# Patient Record
Sex: FEMALE | NOT HISPANIC OR LATINO | Employment: FULL TIME | URBAN - METROPOLITAN AREA
[De-identification: names, ages, dates, MRNs, and addresses within clinical notes are randomized per-mention and may not be internally consistent; named-entity substitution may affect disease eponyms.]

---

## 2020-05-13 RX ORDER — FOLIC ACID 1 MG/1
1 TABLET ORAL DAILY
COMMUNITY

## 2020-05-13 RX ORDER — BUPRENORPHINE HYDROCHLORIDE AND NALOXONE HYDROCHLORIDE DIHYDRATE 8; 2 MG/1; MG/1
1 TABLET SUBLINGUAL DAILY
COMMUNITY

## 2020-05-13 RX ORDER — SERTRALINE HYDROCHLORIDE 25 MG/1
25 TABLET, FILM COATED ORAL DAILY
COMMUNITY

## 2020-05-14 ENCOUNTER — OFFICE VISIT (OUTPATIENT)
Dept: NEUROSURGERY | Facility: CLINIC | Age: 45
End: 2020-05-14

## 2020-05-14 VITALS — BODY MASS INDEX: 35.19 KG/M2 | HEIGHT: 65 IN | WEIGHT: 211.2 LBS | TEMPERATURE: 98.2 F | RESPIRATION RATE: 15 BRPM

## 2020-05-14 DIAGNOSIS — M54.50 CHRONIC MIDLINE LOW BACK PAIN WITHOUT SCIATICA: Primary | ICD-10-CM

## 2020-05-14 DIAGNOSIS — G89.29 CHRONIC MIDLINE LOW BACK PAIN WITHOUT SCIATICA: Primary | ICD-10-CM

## 2020-05-14 DIAGNOSIS — M51.36 DDD (DEGENERATIVE DISC DISEASE), LUMBAR: ICD-10-CM

## 2020-05-14 PROBLEM — F33.1 MODERATE RECURRENT MAJOR DEPRESSION (HCC): Status: ACTIVE | Noted: 2020-05-14

## 2020-05-14 PROBLEM — J30.1 ALLERGIC RHINITIS DUE TO POLLEN: Status: ACTIVE | Noted: 2020-05-14

## 2020-05-14 PROBLEM — I10 ESSENTIAL HYPERTENSION: Status: ACTIVE | Noted: 2020-05-14

## 2020-05-14 PROBLEM — K21.9 GASTRO-ESOPHAGEAL REFLUX DISEASE WITHOUT ESOPHAGITIS: Status: ACTIVE | Noted: 2020-05-14

## 2020-05-14 PROBLEM — I11.9 HYPERTENSIVE HEART DISEASE WITHOUT CONGESTIVE HEART FAILURE: Status: ACTIVE | Noted: 2020-05-14

## 2020-05-14 PROCEDURE — 99203 OFFICE O/P NEW LOW 30 MIN: CPT | Performed by: NEUROLOGICAL SURGERY

## 2020-05-14 RX ORDER — FAMOTIDINE 20 MG/1
20 TABLET, FILM COATED ORAL 2 TIMES DAILY
COMMUNITY

## 2020-05-14 NOTE — PROGRESS NOTES
NAME: JUVE HOUSTON   DOS: 2020  : 1975  PCP: Marbella Hernandez PA    Chief Complaint:    Chief Complaint   Patient presents with   • Back Pain       History of Present Illness:  44 y.o. female   I saw this 44-year-old female with a history of chronic axial back pain for a number of years she has a history of fibromyalgia and seizure disorder.  She reports a fall approximately a year ago with worsening pain she has no symptoms of convincing sciatica but does occasionally have pain in her hips she occasionally has radiation but nothing that is persistent if she does walk distance she occasionally gets numbness in the lateral aspects of her feet.  She has not been through recent physical therapy but does attempt to tried yoga etc. she has a history of opioid use in the past but is weaned herself she is here for evaluation    PMHX  Allergies:  Allergies   Allergen Reactions   • Ultram [Tramadol] Other (See Comments)     Seizures   • Wellbutrin [Bupropion] Other (See Comments)     Seizures     Medications    Current Outpatient Medications:   •  famotidine (PEPCID) 20 MG tablet, Take 20 mg by mouth 2 (Two) Times a Day., Disp: , Rfl:   •  buprenorphine-naloxone (SUBOXONE) 8-2 MG per SL tablet, Place 1 tablet under the tongue Daily., Disp: , Rfl:   •  fluticasone (FLONASE) 50 MCG/ACT nasal spray, 2 sprays into each nostril daily. Administer 2 sprays in each nostril for each dose., Disp: , Rfl:   •  folic acid (FOLVITE) 1 MG tablet, Take 1 mg by mouth Daily., Disp: , Rfl:   •  naproxen (NAPROSYN) 500 MG tablet, Take 500 mg by mouth 2 (two) times a day with meals., Disp: , Rfl:   •  pregabalin (LYRICA) 300 MG capsule, Take 300 mg by mouth 2 (Two) Times a Day., Disp: , Rfl:   •  pseudoephedrine (SUDAFED) 30 MG tablet, Take 30 mg by mouth every 4 (four) hours as needed for congestion., Disp: , Rfl:   •  ranitidine (ZANTAC) 15 MG/ML syrup, Take 4 mg/kg/day by mouth 2 (two) times a day., Disp: , Rfl:   •   sertraline (ZOLOFT) 25 MG tablet, Take 25 mg by mouth Daily., Disp: , Rfl:   Past Medical History:  Past Medical History:   Diagnosis Date   • Arthritis    • Epilepsy (CMS/HCC)     Last seizure October 2011   • Fibromyalgia    • GERD (gastroesophageal reflux disease)    • History of transfusion    • Hypertension    • Low back pain    • Lumbosacral disc disease    • Seizures (CMS/HCC)      Past Surgical History:  Past Surgical History:   Procedure Laterality Date   • CARPAL TUNNEL RELEASE Right 2009   • CHOLECYSTECTOMY  1997   • HEMORRHOIDECTOMY  2016   • KNEE SURGERY Left 2013    arthoscopic   • SUBTOTAL HYSTERECTOMY     • TONSILECTOMY, ADENOIDECTOMY, BILATERAL MYRINGOTOMY AND TUBES  2001   • TUBAL ABDOMINAL LIGATION       Social Hx:  Social History     Tobacco Use   • Smoking status: Current Every Day Smoker   • Smokeless tobacco: Never Used   Substance Use Topics   • Alcohol use: Yes   • Drug use: Not on file     Family Hx:  Family History   Problem Relation Age of Onset   • Hyperlipidemia Mother    • Hypertension Mother    • Stroke Mother      Review of Systems:        Review of Systems   Constitutional: Negative for activity change, appetite change, chills, diaphoresis, fatigue, fever and unexpected weight change.   HENT: Negative for congestion, dental problem, drooling, ear discharge, ear pain, facial swelling, hearing loss, mouth sores, nosebleeds, postnasal drip, rhinorrhea, sinus pressure, sneezing, sore throat, tinnitus, trouble swallowing and voice change.    Eyes: Negative for photophobia, pain, discharge, redness, itching and visual disturbance.   Respiratory: Negative for apnea, cough, choking, chest tightness, shortness of breath, wheezing and stridor.    Cardiovascular: Negative for chest pain, palpitations and leg swelling.   Gastrointestinal: Negative for abdominal distention, abdominal pain, anal bleeding, blood in stool, constipation, diarrhea, nausea, rectal pain and vomiting.   Endocrine:  Negative for cold intolerance, heat intolerance, polydipsia, polyphagia and polyuria.   Genitourinary: Negative for decreased urine volume, difficulty urinating, dysuria, enuresis, flank pain, frequency, genital sores, hematuria and urgency.   Musculoskeletal: Positive for arthralgias, back pain, gait problem and myalgias. Negative for joint swelling, neck pain and neck stiffness.   Skin: Negative for color change, pallor, rash and wound.   Allergic/Immunologic: Negative for environmental allergies, food allergies and immunocompromised state.   Neurological: Positive for numbness. Negative for dizziness, tremors, seizures, syncope, facial asymmetry, speech difficulty, weakness, light-headedness and headaches.   Hematological: Negative for adenopathy. Does not bruise/bleed easily.   Psychiatric/Behavioral: Negative for agitation, behavioral problems, confusion, decreased concentration, dysphoric mood, hallucinations, self-injury, sleep disturbance and suicidal ideas. The patient is not nervous/anxious and is not hyperactive.    All other systems reviewed and are negative.     I have reviewed this note template and all pertinent parts of the review of systems social, family history, surgical history and medication list    Physical Examination:  Vitals:    05/14/20 1058   Resp: 15   Temp: 98.2 °F (36.8 °C)      General Appearance:   Well developed, well nourished, well groomed, alert, and cooperative.  Neurological examination:  Neurologic Exam  Vital signs were reviewed and documented in the chart  Patient appeared in good neurologic function with normal comprehension fluent speech  Mood and affect are normal  Sense of smell deferred    Pupils symmetric equally reactive funduscopic exam not visualized   Visual fields intact to confrontation  Extraocular movements intact  Face motor function is symmetric  Facial sensations normal  Hearing intact to finger rub hearing intact to finger rub  Tongue is midline  Palate  symmetric  Swallowing normal  Shoulder shrug normal  Some trigger points noted muscle bulk and tone normal  5 out of 5 strength no motor drift  Gait normal intact  Negative Romberg  No clonus long tract signs or myelopathy    Reflexes symmetric  Mild edema noted and extremities skin appears normal    Straight leg raise sign absent  No signs of intrinsic hip dysfunction however she is tight with decreased range of motion bilaterally  Back is without any lesions or abnormality  Feet are warm and well perfused        Review of Imaging/DATA:  I reviewed an MRI of her lumbar spine she is got degenerative disc with some annular tears  L2-3, there may be a small disc bulge intraforaminal at 3 4  Diagnoses/Plan:    Ms. Pisano is a 44 y.o. female   1.  Fibromyalgia history of many rheumatologic type symptoms including morning pain with alleviation with mobility and nonsteroidal anti-inflammatories she has no commencing neurogenic claudication relief of pain with flexion that would argue against degenerative disc I denies any persistent radiculopathy that being said she does have mid lumbar degenerative changes in approximately the 2 3-3 4 area I discussed surgery and I do not think that is the right answer for her    I recommended    Dietary modification  Follow-up with rheumatology for further work-up regarding rheumatologic disorders giving morning pain stiffness etc. and fibromyalgia management  Needs to form a relationship with an interventional pain specialist to work with pain treatment strategies.  Mindfulness and exercise yoga heat massage etc.    Spent 30 minutes with her discussing the treatment plan and explained signs and symptoms that would necessitate referral follow-up

## 2020-08-04 ENCOUNTER — TELEPHONE (OUTPATIENT)
Dept: PAIN MEDICINE | Facility: CLINIC | Age: 45
End: 2020-08-04

## 2020-08-04 NOTE — TELEPHONE ENCOUNTER
Attempted to contact patient to schedule new patient referral appt with Pain Management. No answer; Left voicemail for patient to return call with office number given.

## 2020-09-09 DIAGNOSIS — Z00.8 PRE-SURGICAL PSYCHOLOGICAL ASSESSMENT, ENCOUNTER FOR: Primary | ICD-10-CM

## 2024-10-11 ENCOUNTER — HOSPITAL ENCOUNTER (EMERGENCY)
Facility: HOSPITAL | Age: 49
Discharge: HOME | End: 2024-10-11
Attending: STUDENT IN AN ORGANIZED HEALTH CARE EDUCATION/TRAINING PROGRAM

## 2024-10-11 VITALS
TEMPERATURE: 98.1 F | BODY MASS INDEX: 31.82 KG/M2 | DIASTOLIC BLOOD PRESSURE: 79 MMHG | HEIGHT: 65 IN | HEART RATE: 52 BPM | WEIGHT: 191 LBS | RESPIRATION RATE: 20 BRPM | SYSTOLIC BLOOD PRESSURE: 117 MMHG | OXYGEN SATURATION: 93 %

## 2024-10-11 DIAGNOSIS — S61.411A LACERATION OF RIGHT HAND WITHOUT FOREIGN BODY, INITIAL ENCOUNTER: Primary | ICD-10-CM

## 2024-10-11 PROCEDURE — 99283 EMERGENCY DEPT VISIT LOW MDM: CPT

## 2024-10-11 PROCEDURE — 25010000002 LIDOCAINE PF 1% 1 % SOLUTION

## 2024-10-11 RX ORDER — LIDOCAINE HYDROCHLORIDE 10 MG/ML
10 INJECTION, SOLUTION EPIDURAL; INFILTRATION; INTRACAUDAL; PERINEURAL ONCE
Status: COMPLETED | OUTPATIENT
Start: 2024-10-11 | End: 2024-10-11

## 2024-10-11 RX ORDER — ACETAMINOPHEN 325 MG/1
650 TABLET ORAL ONCE
Status: COMPLETED | OUTPATIENT
Start: 2024-10-11 | End: 2024-10-11

## 2024-10-11 RX ORDER — DIAPER,BRIEF,INFANT-TODD,DISP
1 EACH MISCELLANEOUS ONCE
Status: COMPLETED | OUTPATIENT
Start: 2024-10-11 | End: 2024-10-11

## 2024-10-11 RX ADMIN — BACITRACIN 0.9 G: 500 OINTMENT TOPICAL at 17:55

## 2024-10-11 RX ADMIN — LIDOCAINE HYDROCHLORIDE 10 ML: 10 INJECTION, SOLUTION EPIDURAL; INFILTRATION; INTRACAUDAL; PERINEURAL at 17:11

## 2024-10-11 RX ADMIN — ACETAMINOPHEN 650 MG: 325 TABLET ORAL at 17:52

## 2024-10-11 NOTE — DISCHARGE INSTRUCTIONS
Today we used 5 sutures to close your hand wound.  Please keep this area clean and dry as we discussed.  Your sutures will need to come out the next 7 to 10 days.  Monitor site closely and make follow-up appointment PCP for reevaluation and suture removal.  Return to ED should symptoms worsen anytime.

## 2024-10-11 NOTE — FSED PROVIDER NOTE
Subjective   History of Present Illness    Patient is a 49-year-old female presents to ED with complaints of laceration.  Patient states that just prior to arrival she was cleaning a knife when she turned over wall try to clean too quickly and cut in between her fingers.  Patient states it looked very deep so she came to ED for evaluation.  Hemostasis achieved prior to initial exam.  Patient not currently on blood thinners.  Patient states that she is up-to-date on her tetanus shot.  Patient denying any loss of range of motion, distal numbness, paresthesias, inappropriate temperature change.  Patient denying any other injuries at this time.      Review of Systems   Constitutional:  Negative for chills, fatigue and fever.   Respiratory:  Negative for shortness of breath.    Cardiovascular:  Negative for chest pain.   Gastrointestinal:  Negative for nausea and vomiting.   Genitourinary:  Negative for dysuria and flank pain.   Skin:  Positive for wound. Negative for color change.   Neurological:  Negative for weakness.       Past Medical History:   Diagnosis Date    Arthritis     Epilepsy     Last seizure October 2011    Fibromyalgia     GERD (gastroesophageal reflux disease)     History of transfusion     Hypertension     Low back pain     Lumbosacral disc disease     Seizures        Allergies   Allergen Reactions    Ultram [Tramadol] Other (See Comments)     Seizures    Wellbutrin [Bupropion] Other (See Comments)     Seizures       Past Surgical History:   Procedure Laterality Date    CARPAL TUNNEL RELEASE Right 2009    CHOLECYSTECTOMY  1997    HEMORRHOIDECTOMY  2016    KNEE SURGERY Left 2013    arthoscopic    SUBTOTAL HYSTERECTOMY      TONSILECTOMY, ADENOIDECTOMY, BILATERAL MYRINGOTOMY AND TUBES  2001    TUBAL ABDOMINAL LIGATION         Family History   Problem Relation Age of Onset    Hyperlipidemia Mother     Hypertension Mother     Stroke Mother        Social History     Socioeconomic History    Marital status:  Single   Tobacco Use    Smoking status: Every Day    Smokeless tobacco: Never   Substance and Sexual Activity    Alcohol use: Yes    Sexual activity: Defer           Objective   Physical Exam  Constitutional:       General: She is not in acute distress.     Appearance: Normal appearance. She is not ill-appearing.   HENT:      Head: Normocephalic and atraumatic.   Cardiovascular:      Rate and Rhythm: Normal rate and regular rhythm.   Pulmonary:      Effort: Pulmonary effort is normal.      Breath sounds: Normal breath sounds.   Abdominal:      General: Abdomen is flat.      Palpations: Abdomen is soft.   Musculoskeletal:      Comments: No decrease in range of motion, full flexion extension intact.   Skin:     General: Skin is warm and dry.      Capillary Refill: Capillary refill takes less than 2 seconds.      Comments: 2 cm laceration noted in space between third and fourth finger.  Hemostasis achieved prior to initial exam.  No obvious bony deformity or foreign body noted.   Neurological:      Mental Status: She is alert and oriented to person, place, and time.   Psychiatric:         Mood and Affect: Mood normal.         Behavior: Behavior normal.         Laceration Repair    Date/Time: 10/11/2024 5:49 PM    Performed by: Nova Pisano PA-C  Authorized by: Luiz Morel MD    Consent:     Consent obtained:  Verbal    Consent given by:  Patient    Risks, benefits, and alternatives were discussed: yes      Risks discussed:  Infection, need for additional repair, nerve damage, poor wound healing, poor cosmetic result, pain, retained foreign body, tendon damage and vascular damage    Alternatives discussed:  No treatment and referral  Universal protocol:     Procedure explained and questions answered to patient or proxy's satisfaction: yes      Patient identity confirmed:  Verbally with patient  Anesthesia:     Anesthesia method:  Local infiltration    Local anesthetic:  Lidocaine 1% w/o epi  Laceration  details:     Location:  Hand    Hand location:  R palm    Length (cm):  2  Exploration:     Hemostasis achieved with:  Direct pressure    Wound exploration: wound explored through full range of motion and entire depth of wound visualized    Treatment:     Area cleansed with:  Chlorhexidine and saline    Amount of cleaning:  Extensive    Irrigation solution:  Sterile saline  Skin repair:     Repair method:  Sutures    Suture size:  5-0    Suture material:  Nylon    Suture technique:  Simple interrupted    Number of sutures:  5  Approximation:     Approximation:  Close  Repair type:     Repair type:  Simple  Post-procedure details:     Dressing:  Antibiotic ointment and non-adherent dressing    Procedure completion:  Tolerated well, no immediate complications             ED Course                                           Medical Decision Making  Risk  OTC drugs.  Prescription drug management.      Patient is a 49-year-old female presents to ED with complaints of finger laceration obtained prior to arrival after patient cut it on a kitchen knife  Patient is currently up-to-date on tetanus.  Patient denying any other injuries at this time.  Patient given tylenol for discomfort.  Closure option discussed with patient and sutures were agreed upon.  Wound closed as above in procedure note.  Wound care extensively discussed with patient at bedside.  Patient to make follow-up appointment  with PCP for reevaluation of wound and to have sutures removed.  Patient verbalized understanding that they may return to ED or go to an urgent care for suture removal should he not be able to get timely follow-up.  Very strict return precautions repeatedly discussed with patient at bedside.  Patient verbalized understanding.         Final diagnoses:   Laceration of right hand without foreign body, initial encounter       ED Disposition  ED Disposition       ED Disposition   Discharge    Condition   Stable    Comment   --                Marbella Hernandez PA  [address] [facility] [address] [phone]      As needed, If symptoms worsen    [facility] EMERGENCY DEPART[facility] [place]  [address]  [facility] [address] [phone]    As needed, If symptoms worsen         Medication List      No changes were made to your prescriptions during this visit.